# Patient Record
Sex: MALE | URBAN - METROPOLITAN AREA
[De-identification: names, ages, dates, MRNs, and addresses within clinical notes are randomized per-mention and may not be internally consistent; named-entity substitution may affect disease eponyms.]

---

## 2018-02-02 ENCOUNTER — RADIATION ONCOLOGY CONSULT (OUTPATIENT)
Dept: RADIATION ONCOLOGY | Facility: MEDICAL CENTER | Age: 78
End: 2018-02-02

## 2018-02-02 DIAGNOSIS — C61 PROSTATE CANCER (HCC): Primary | ICD-10-CM

## 2018-02-02 NOTE — PROGRESS NOTES
IPSS Questionnaire (AUA-7): Over the past month    1)  How often have you had a sensation of not emptying your bladder completely after you finish urinating? 5   2)  How often have you had to urinate again less than two hours after you finished urinating? 2   3)  How often have you found you stopped and started again several times when you urinated? 1   4) How difficult have you found it to postpone urination? 1   5) How often have you had a weak urinary stream?  2   6) How often have you had to push or strain to begin urination? 3   7) How many times did you most typically get up to urinate from the time you went to bed until the time you got up in the morning? 4   Total score:  0-7 mildly symptomatic    8-19 moderately [id number]    20-35 severely symptomatic         Valerie Hammond is a 66 y o  male with prostate ca accompanied by his wife   Gaudencio 6   3 cores  Dx'd 12/23/17  Elevated PSA 12

## 2018-02-05 VITALS — SYSTOLIC BLOOD PRESSURE: 120 MMHG | HEART RATE: 72 BPM | DIASTOLIC BLOOD PRESSURE: 70 MMHG

## 2018-02-05 RX ORDER — SIMVASTATIN 40 MG
1 TABLET ORAL DAILY
COMMUNITY

## 2018-02-05 RX ORDER — TAMSULOSIN HYDROCHLORIDE 0.4 MG/1
1 CAPSULE ORAL DAILY
COMMUNITY

## 2018-02-05 RX ORDER — LISINOPRIL 20 MG/1
1 TABLET ORAL DAILY
COMMUNITY

## 2018-02-05 NOTE — PROGRESS NOTES
Fidel Test  1940  [id number]    Radiation Oncology Consult  [facility] RADIATION ONCOLOGY     No history exists  There is no problem list on file for this patient  No matching staging information was found for the patient  History reviewed  No pertinent past medical history  Social History     Social History    Marital status: N/A     Spouse name: N/A    Number of children: N/A    Years of education: N/A     Occupational History    Not on file  Social History Main Topics    Smoking status: Not on file    Smokeless tobacco: Not on file    Alcohol use Not on file    Drug use: Unknown    Sexual activity: Not on file     Other Topics Concern    Not on file     Social History Narrative    No narrative on file      History reviewed  No pertinent family history  History reviewed  No pertinent surgical history      Current Outpatient Prescriptions:     lisinopril (ZESTRIL) 20 mg tablet, Take 1 tablet by mouth daily, Disp: , Rfl:     simvastatin (ZOCOR) 40 mg tablet, Take 1 tablet by mouth daily, Disp: , Rfl:     tamsulosin (FLOMAX) 0 4 mg, Take 1 capsule by mouth daily, Disp: , Rfl:   No Known Allergies    Review of Systems:  Review of Systems    Physical Exam:  Physical Exam      IMAGING: ***      LABS:    CBC  Diff   No results found for: WBC, HGB, HCT, SEDRATE, RBC, MCV, MCHC, MCH, RDW, MPV No results found for: LYMPHOCYTES, MONOCYTES, BASOPHILS, LYMPHSABS, EOSABS, MONOSABS, BASOSABS     Basic Metabolic Profile    No results found for: CO2, ANIONGAP No results found for: BUN, CREATININE, GLUCOSE     Other Labs:     Discussion/Summary:

## 2018-02-05 NOTE — PROGRESS NOTES
Fidel Test  1940  [id number]    Radiation Oncology Consult  [facility] RADIATION ONCOLOGY     No history exists  There is no problem list on file for this patient  No matching staging information was found for the patient  History reviewed  No pertinent past medical history  Social History     Social History    Marital status: N/A     Spouse name: N/A    Number of children: N/A    Years of education: N/A     Occupational History    Not on file  Social History Main Topics    Smoking status: Not on file    Smokeless tobacco: Not on file    Alcohol use Not on file    Drug use: Unknown    Sexual activity: Not on file     Other Topics Concern    Not on file     Social History Narrative    No narrative on file      History reviewed  No pertinent family history  History reviewed  No pertinent surgical history      Current Outpatient Prescriptions:     lisinopril (ZESTRIL) 20 mg tablet, Take 1 tablet by mouth daily, Disp: , Rfl:     simvastatin (ZOCOR) 40 mg tablet, Take 1 tablet by mouth daily, Disp: , Rfl:     tamsulosin (FLOMAX) 0 4 mg, Take 1 capsule by mouth daily, Disp: , Rfl:   No Known Allergies    Review of Systems:  Review of Systems    Physical Exam:  Physical Exam   Skin:              IMAGING: ***      LABS:    CBC  Diff   No results found for: WBC, HGB, HCT, SEDRATE, RBC, MCV, MCHC, MCH, RDW, MPV No results found for: LYMPHOCYTES, MONOCYTES, BASOPHILS, LYMPHSABS, EOSABS, MONOSABS, BASOSABS     Basic Metabolic Profile    No results found for: CO2, ANIONGAP No results found for: BUN, CREATININE, GLUCOSE     Other Labs:     Discussion/Summary:

## 2018-02-06 RX ORDER — BICALUTAMIDE 50 MG/1
50 TABLET, FILM COATED ORAL DAILY
Qty: 30 TABLET | Refills: 3 | Status: SHIPPED | OUTPATIENT
Start: 2018-02-06

## 2018-02-06 RX ORDER — TAMSULOSIN HYDROCHLORIDE 0.4 MG/1
0.4 CAPSULE ORAL
Qty: 30 CAPSULE | Refills: 3 | Status: SHIPPED | OUTPATIENT
Start: 2018-02-06

## 2018-02-07 NOTE — PROGRESS NOTES
Fidel Test  1940  [id number]      1  Prostate cancer (HCC)  PSA, ultrasensitive    PSA, total and free    bicalutamide (CASODEX) 50 mg tablet    tamsulosin (FLOMAX) 0 4 mg      No history exists  OB/GYN History:  The patient underwent menarche at {0-GEMA) ***  20:13502} years and menopause at {30-50:15231}} years  Age at first delivery being {15-40:26153} years  Nursing: {yes/no:63}  Birth control pills: {yes/no:63}  Hormone replacement therapy: {yes/no:63}     ***(DELETE IF PATIENT IS M  There is no problem list on file for this patient  History reviewed  No pertinent past medical history  History reviewed  No pertinent surgical history  History reviewed  No pertinent family history  Social History     Social History    Marital status: N/A     Spouse name: N/A    Number of children: N/A    Years of education: N/A     Occupational History    Not on file       Social History Main Topics    Smoking status: Not on file    Smokeless tobacco: Not on file    Alcohol use Not on file    Drug use: Unknown    Sexual activity: Not on file     Other Topics Concern    Not on file     Social History Narrative    No narrative on file       Current Outpatient Prescriptions:     bicalutamide (CASODEX) 50 mg tablet, Take 1 tablet (50 mg total) by mouth daily, Disp: 30 tablet, Rfl: 3    lisinopril (ZESTRIL) 20 mg tablet, Take 1 tablet by mouth daily, Disp: , Rfl:     simvastatin (ZOCOR) 40 mg tablet, Take 1 tablet by mouth daily, Disp: , Rfl:     tamsulosin (FLOMAX) 0 4 mg, Take 1 capsule (0 4 mg total) by mouth daily with dinner, Disp: 30 capsule, Rfl: 3    tamsulosin (FLOMAX) 0 4 mg, Take 1 capsule by mouth daily, Disp: , Rfl:   No Known Allergies    Review of Systems:  Review of Systems    Vitals:    02/05/18 1310   BP: 120/70   Pulse: 72         Additional Notes:***

## 2018-08-08 ENCOUNTER — TELEPHONE (OUTPATIENT)
Dept: FAMILY MEDICINE CLINIC | Facility: CLINIC | Age: 78
End: 2018-08-08

## 2019-01-25 ENCOUNTER — VBI (OUTPATIENT)
Dept: FAMILY MEDICINE CLINIC | Facility: CLINIC | Age: 79
End: 2019-01-25

## 2019-01-25 NOTE — TELEPHONE ENCOUNTER
Fidel Test    ED Visit Information     Ed visit date: 1/24/2019  Diagnosis Description: hemorroids  In Network? Yes [address]  Discharge status: Home  Discharged with meds ? No  Number of ED visits to date: 1  ED Severity:N/A     Outreach Information    Outreach successful: No 1  Date letter mailed:   Date Finalized:    Care Coordination      Transportation issues ?  No    Value Base Outreach    1/25/2019 - 1st attempt [facility]

## 2022-11-09 ENCOUNTER — ANTICOAG VISIT (OUTPATIENT)
Dept: FAMILY MEDICINE CLINIC | Facility: CLINIC | Age: 82
End: 2022-11-09